# Patient Record
Sex: FEMALE | Race: WHITE
[De-identification: names, ages, dates, MRNs, and addresses within clinical notes are randomized per-mention and may not be internally consistent; named-entity substitution may affect disease eponyms.]

---
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
## 2022-02-04 ENCOUNTER — HOSPITAL ENCOUNTER (EMERGENCY)
Dept: HOSPITAL 63 - ER | Age: 22
Discharge: HOME | End: 2022-02-04
Payer: SELF-PAY

## 2022-02-04 VITALS — HEIGHT: 62 IN | BODY MASS INDEX: 36.88 KG/M2 | WEIGHT: 200.4 LBS

## 2022-02-04 VITALS — SYSTOLIC BLOOD PRESSURE: 137 MMHG | DIASTOLIC BLOOD PRESSURE: 89 MMHG

## 2022-02-04 DIAGNOSIS — R51.9: ICD-10-CM
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
## 2022-02-04 DIAGNOSIS — S33.6XXA: Primary | ICD-10-CM
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
## 2022-02-04 DIAGNOSIS — F17.210: ICD-10-CM
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
## 2022-02-04 PROCEDURE — 99284 EMERGENCY DEPT VISIT MOD MDM: CPT

## 2022-02-04 PROCEDURE — 72100 X-RAY EXAM L-S SPINE 2/3 VWS: CPT

## 2022-02-04 NOTE — RAD
Lumbar spine 3 views.



HISTORY: Pain left lower lumbar



3 views were taken of the lumbar spine. Bowel pattern of the abdomen is unremarkable. Lumbar spine is
 in normal alignment. A fracture is not identified. There is transitional anatomy at the thoracolumba
r junction and lumbosacral junction.



IMPRESSION:

1. No fracture or acute osseous abnormality in the lumbar spine.



Electronically signed by: Nabil Cherry MD (2/4/2022 9:03 AM) [id number]

## 2022-02-04 NOTE — PHYS DOC
Past History


Past Medical History:  No Pertinent History


Past Surgical History:  Tonsillectomy


Smoking:  Cigarettes


Alcohol Use:  Occasionally


Drug Use:  Marijuana





General Adult


EDM:


Chief Complaint:  MECHANICAL FALL





HPI:


HPI:


21-year-old female presents after an accident at work.  The patient was walking 

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 

christina full of materials.  The patient was also carrying a box.  She got her 

right foot twisted and the christina and fell to the ground.  She knows that she hit

the back of her head on the ground.  She was not knocked unconscious.  She was 

stunned for 10 to 15 seconds.  She did not have any vomiting.  She denies any s

ignificant neurological symptoms.  Her primary concern is low back pain.  She is

not sure if she hit her back or just twisted it as she fell but she has pain in 

the low back worse on the left.  No history of back problems or surgeries.  No 

loss of bowel or bladder.  She has no other complaints at this time.





Review of Systems:


Review of Systems:


Constitutional:  Denies fever or chills 


Eyes:  Denies change in visual acuity 


HENT:  Denies nasal congestion or sore throat 


Respiratory:  Denies cough or shortness of breath 


Cardiovascular:  Denies chest pain or edema 


GI:  Denies abdominal pain, nausea, vomiting, bloody stools or diarrhea 


: Denies dysuria 


Musculoskeletal: Low back pain


Integument:  Denies rash 


Neurologic:  Mild headache. Denies focal weakness or sensory changes 


Endocrine:  Denies polyuria or polydipsia 


Lymphatic:  Denies swollen glands 


Psychiatric:  Denies depression or anxiety





Allergies:


Allergies:





Allergies








Coded Allergies Type Severity Reaction Last Updated Verified


 


  No Known Drug Allergies    1/30/19 No











Physical Exam:


PE:





Constitutional: Well developed, well nourished, obese, no acute distress, non-

toxic appearance. []


HENT: Normocephalic, atraumatic, bilateral external ears normal, oropharynx 

moist, no oral exudates, nose normal. []


Eyes: PERRLA, EOMI, conjunctiva normal, no discharge. [] 


Neck: Normal range of motion, no tenderness, supple, no stridor. [] 


Cardiovascular: Heart rate regular rhythm, no murmur []


Lungs & Thorax:  Bilateral breath sounds clear to auscultation []


Abdomen: Bowel sounds normal, soft, no tenderness, no masses, no pulsatile 

masses. [] 


Skin: Warm, dry, no erythema, no rash. [] 


Back: Tenderness over the sacrum worse at the left sacroiliac joint.  [] 


Extremities: No tenderness, no cyanosis, no clubbing, ROM intact, no edema. [] 


Neurologic: Alert and oriented X 3, normal motor function, normal sensory 

function, no focal deficits noted. []


Psychologic: Affect normal, judgement normal, mood normal. []





Current Patient Data:


Vital Signs:





                                   Vital Signs








  Date Time  Temp Pulse Resp B/P (MAP) Pulse Ox O2 Delivery O2 Flow Rate FiO2


 


2/4/22 08:26 97.9 109 16 137/89 (105) 99 Room Air  











EKG:


EKG:


[]





Radiology/Procedures:


Radiology/Procedures:


[]


Impressions:


Lumbar spine 3 views.





HISTORY: Pain left lower lumbar





3 views were taken of the lumbar spine. Bowel pattern of the abdomen is 

unremarkable. Lumbar spine is in normal alignment. A fracture is not identified.

 There is transitional anatomy at the thoracolumbar junction and lumbosacral 

junction.





IMPRESSION:


1. No fracture or acute osseous abnormality in the lumbar spine.





Electronically signed by: Nabil Amaya MD (2/4/2022 9:03 AM) [id number]














DICTATED AND SIGNED BY:     NABIL AMAYA MD


DATE:     02/04/22 0902





CC: TORI ZIMMERMAN DO; DEANGELO BOB ~MTH0 0





Heart Score:


C/O Chest Pain:  N/A


Risk Factors:


Risk Factors:  DM, Current or recent (<one month) smoker, HTN, HLP, family 

history of CAD, obesity.


Risk Scores:


Score 0 - 3:  2.5% MACE over next 6 weeks - Discharge Home


Score 4 - 6:  20.3% MACE over next 6 weeks - Admit for Clinical Observation


Score 7 - 10:  72.7% MACE over next 6 weeks - Early Invasive Strategies





Course & Med Decision Making:


Course & Med Decision Making


Pertinent Labs and Imaging studies reviewed. (See chart for details)


The patient's x-ray is negative for acute findings.  Based on my exam, I believe

 the patient has acute strain of the sacroiliac joint worse on the left.  We 

will treat her with prednisone and Flexeril.  We will give her the first dose 

emergency room.  She is stable for discharge at this time.


[]





Dragon Disclaimer:


Dragon Disclaimer:


This electronic medical record was generated, in whole or in part, using a voice

 recognition dictation system.





Departure


Departure:


Impression:  


   Primary Impression:  


   Sacroiliac (ligament) sprain


   Qualified Codes:  S33.6XXA - Sprain of sacroiliac joint, initial encounter


Disposition:  01 HOME / SELF CARE / HOMELESS


Condition:  STABLE


Referrals:  


DEANGELO BOB (PCP)


Patient Instructions:  Sacroiliac Joint Dysfunction


Scripts


Prednisone (PREDNISONE) 10 Mg Tablet


50 MG PO DAILY for back strain for 3 Days, #15 TAB


   Prov: TORI ZIMMERMAN DO         2/4/22 


Cyclobenzaprine Hcl (CYCLOBENZAPRINE HCL) 10 Mg Tablet


1 TAB PO TID PRN for MUSCLE SPASMS, #30 TAB


   Prov: TORI ZIMMERMAN DO         2/4/22











TORI ZIMMERMAN DO                  Feb 4, 2022 08:44

## 2022-03-01 ENCOUNTER — HOSPITAL ENCOUNTER (EMERGENCY)
Dept: HOSPITAL 63 - ER | Age: 22
Discharge: HOME | End: 2022-03-01
Payer: SELF-PAY

## 2022-03-01 VITALS — DIASTOLIC BLOOD PRESSURE: 67 MMHG | SYSTOLIC BLOOD PRESSURE: 113 MMHG

## 2022-03-01 VITALS — WEIGHT: 200.4 LBS | BODY MASS INDEX: 30.37 KG/M2 | HEIGHT: 68 IN

## 2022-03-01 DIAGNOSIS — L02.31: Primary | ICD-10-CM

## 2022-03-01 DIAGNOSIS — F17.210: ICD-10-CM

## 2022-03-01 PROCEDURE — 10060 I&D ABSCESS SIMPLE/SINGLE: CPT

## 2022-03-01 PROCEDURE — 99283 EMERGENCY DEPT VISIT LOW MDM: CPT

## 2022-03-01 NOTE — PHYS DOC
Past History


Past Medical History:  No Pertinent History


Past Surgical History:  Tonsillectomy


Smoking:  Cigarettes


Alcohol Use:  None


Drug Use:  Marijuana





General Adult


EDM:


Chief Complaint:  ABSCESS





HPI:


HPI:


Patient is a 22-year-old female who presents to the emergency department with a 

abscess to her left buttock that started 2 days ago.  Patient denies any rectal 

pain, nausea, vomiting, fevers.  She reports that she is has a history of 

abscesses in this area and was seen in this emergency department and had it 

drained.





Review of Systems:


Review of Systems:


Constitutional: See HPI


GI: HPI


Musculoskeletal: See HPI


Integument: See HPI





Current Medications:


Current Meds:





Current Medications








 Medications


  (Trade)  Dose


 Ordered  Sig/Merrick  Start Time


 Stop Time Status Last Admin


Dose Admin


 


 Lidocaine HCl  20 ml  1X  ONCE  3/1/22 17:45


 3/1/22 17:46 DC  





 


 Trimethoprim/


 Sulfamethoxazole


  (Bactrim Ds)  1 tab  1X  ONCE  3/1/22 17:45


 3/1/22 17:46 DC  














Allergies:


Allergies:





Allergies








Coded Allergies Type Severity Reaction Last Updated Verified


 


  No Known Drug Allergies    1/30/19 No











Physical Exam:


PE:





Constitutional: Well developed, well nourished, no acute distress, non-toxic 

appearance. []


HENT: Normocephalic, atraumatic, bilateral external ears normal, oropharynx 

moist, no oral exudates, nose normal. []


Eyes: PERRL, EOMI, conjunctiva normal, no discharge. [] 


Neck: Normal range of motion, no stridor


Cardiovascular: Normal peripheral perfusion


Lungs & Thorax: Normal work of breathing, no tachypnea


Abdomen: Soft and flat


Skin: Warm, dry, 5 cm area of redness to left inner buttock with an area of 

swelling consistent with an abscess measuring 3 cm, no active drainage, small 

area of fluctuance noted


Back: Normal range of motion


Extremities: No tenderness, no cyanosis, no clubbing, ROM intact, no edema. [] 


Neurologic: Alert and oriented X 3, normal motor function, normal sensory 

function, no focal deficits noted. []


Psychologic: Affect normal, judgement normal, mood normal. []





EKG:


EKG:


[]





Radiology/Procedures:


Radiology/Procedures:


[]





Heart Score:


C/O Chest Pain:  N/A


Risk Factors:


Risk Factors:  DM, Current or recent (<one month) smoker, HTN, HLP, family 

history of CAD, obesity.


Risk Scores:


Score 0 - 3:  2.5% MACE over next 6 weeks - Discharge Home


Score 4 - 6:  20.3% MACE over next 6 weeks - Admit for Clinical Observation


Score 7 - 10:  72.7% MACE over next 6 weeks - Early Invasive Strategies





Course & Med Decision Making:


Course & Med Decision Making


Pertinent Labs and Imaging studies reviewed. (See chart for details)





[] Patient presents to the emergency department for an abscess to her left inner

 buttock that started 2 days ago.  Patient reports that her tetanus is 

up-to-date.  Incision and drainage performed.  Patient tolerated procedure.  

Dressing placed.  Patient discharged home with antibiotic.  Patient advised to 

apply warm compresses and take Tylenol and ibuprofen for her pain.  I discussed 

with patient all findings and diagnostic testing as well as the need to follow-

up with PCP for further evaluation and treatment or return to the ER if any new 

or worsening symptoms.  Strict return precautions were also discussed at length.

  Patient voiced understanding and agreement with the plan.  Patient is 

hemodynamically stable at the time of disposition.





Dragon Disclaimer:


Dragon Disclaimer:


This electronic medical record was generated, in whole or in part, using a voice

 recognition dictation system.


Incision and Drainage


Indication: [Abscess to left buttock





Procedure: The patient was positioned appropriately and the skin over the 

incision site was Betadine. Local anesthesia was 1% lidocaine.  An incision was 

then made over the apex of the abscess on the left inner buttock and very small 

amount of serosanguineous material was expressed.  The patients tetanus status 

is up-to-date


The patient tolerated the procedure 





Complications: None





Departure


Departure:


Impression:  


   Primary Impression:  


   Abscess


Disposition:  01 HOME / SELF CARE / HOMELESS


Condition:  GOOD


Referrals:  


DEANGELO BOB (PCP)


Patient Instructions:  Abscess, Abscess, Care After





Additional Instructions:  


You were seen in the emergency department today for an abscess to your buttock. 

 This was numbed and drained in the emergency department.  Please continue to 

apply warm compresses to the area to help with the drainage.  He can take 

Tylenol and ibuprofen for pain at home.  You are being discharged home with an 

antibiotic.  Please start and finish this completely.  Take Tylenol and 

ibuprofen for any pain at home.  Follow-up with your primary care provider 

tomorrow regarding your ER visit.  Return to the emergency department if you 

develop worsening of your abscess, worsening of the redness, high fevers 

refractory to treatment, intractable nausea or vomiting, rectal pain, weakness.


Scripts


Sulfamethoxazole/Trimethoprim (BACTRIM DS TABLET) 1 Each Tablet


1 TAB PO BID for abscess for 7 Days, #14 TAB 0 Refills


   Prov: NANCY MCCORD         3/1/22











NANCY MCCORD           Mar 1, 2022 17:58

## 2022-04-01 ENCOUNTER — HOSPITAL ENCOUNTER (EMERGENCY)
Dept: HOSPITAL 63 - ER | Age: 22
Discharge: HOME | End: 2022-04-01
Payer: SELF-PAY

## 2022-04-01 VITALS — WEIGHT: 169.09 LBS | BODY MASS INDEX: 29.96 KG/M2 | HEIGHT: 63 IN

## 2022-04-01 VITALS — SYSTOLIC BLOOD PRESSURE: 122 MMHG | DIASTOLIC BLOOD PRESSURE: 89 MMHG

## 2022-04-01 DIAGNOSIS — R51.9: ICD-10-CM

## 2022-04-01 DIAGNOSIS — R11.10: Primary | ICD-10-CM

## 2022-04-01 DIAGNOSIS — F17.210: ICD-10-CM

## 2022-04-01 LAB
ALBUMIN SERPL-MCNC: 4.7 G/DL (ref 3.4–5)
ALBUMIN/GLOB SERPL: 1.1 {RATIO} (ref 1–1.7)
ALP SERPL-CCNC: 63 U/L (ref 46–116)
ALT SERPL-CCNC: 25 U/L (ref 14–59)
AMPHETAMINE/METHAMPHETAMINE: (no result)
ANION GAP SERPL CALC-SCNC: 11 MMOL/L (ref 6–14)
APTT PPP: YELLOW S
AST SERPL-CCNC: 19 U/L (ref 15–37)
BACTERIA #/AREA URNS HPF: 0 /HPF
BARBITURATES UR-MCNC: (no result) UG/ML
BASOPHILS # BLD AUTO: 0.1 X10^3/UL (ref 0–0.2)
BASOPHILS NFR BLD: 1 % (ref 0–3)
BENZODIAZ UR-MCNC: (no result) UG/L
BILIRUB SERPL-MCNC: 0.5 MG/DL (ref 0.2–1)
BUN/CREAT SERPL: 13 (ref 6–20)
CA-I SERPL ISE-MCNC: 8 MG/DL (ref 7–20)
CALCIUM SERPL-MCNC: 9.7 MG/DL (ref 8.5–10.1)
CANNABINOIDS UR-MCNC: (no result) UG/L
CHLORIDE SERPL-SCNC: 102 MMOL/L (ref 98–107)
CO2 SERPL-SCNC: 24 MMOL/L (ref 21–32)
COCAINE UR-MCNC: (no result) NG/ML
CREAT SERPL-MCNC: 0.6 MG/DL (ref 0.6–1)
EOSINOPHIL NFR BLD: 0 % (ref 0–3)
EOSINOPHIL NFR BLD: 0 X10^3/UL (ref 0–0.7)
ERYTHROCYTE [DISTWIDTH] IN BLOOD BY AUTOMATED COUNT: 13.8 % (ref 11.5–14.5)
FIBRINOGEN PPP-MCNC: CLEAR MG/DL
GFR SERPLBLD BASED ON 1.73 SQ M-ARVRAT: 126.2 ML/MIN
GLOBULIN SER-MCNC: 4.1 G/DL (ref 2.2–3.8)
GLUCOSE SERPL-MCNC: 110 MG/DL (ref 70–99)
GLUCOSE UR STRIP-MCNC: (no result) MG/DL
HCT VFR BLD CALC: 49.9 % (ref 36–47)
HGB BLD-MCNC: 16.6 G/DL (ref 12–15.5)
LIPASE: 129 U/L (ref 73–393)
LYMPHOCYTES # BLD: 1.2 X10^3/UL (ref 1–4.8)
LYMPHOCYTES NFR BLD AUTO: 15 % (ref 24–48)
MCH RBC QN AUTO: 31 PG (ref 25–35)
MCHC RBC AUTO-ENTMCNC: 33 G/DL (ref 31–37)
MCV RBC AUTO: 94 FL (ref 79–100)
METHADONE SERPL-MCNC: (no result) NG/ML
MONO #: 0.3 X10^3/UL (ref 0–1.1)
MONOCYTES NFR BLD: 4 % (ref 0–9)
NEUT #: 6.7 X10^3UL (ref 1.8–7.7)
NEUTROPHILS NFR BLD AUTO: 80 % (ref 31–73)
NITRITE UR QL STRIP: (no result)
OPIATES UR-MCNC: (no result) NG/ML
PCP SERPL-MCNC: (no result) MG/DL
PLATELET # BLD AUTO: 270 X10^3/UL (ref 140–400)
POTASSIUM SERPL-SCNC: 3.9 MMOL/L (ref 3.5–5.1)
PROT SERPL-MCNC: 8.8 G/DL (ref 6.4–8.2)
RBC # BLD AUTO: 5.33 X10^6/UL (ref 3.5–5.4)
RBC #/AREA URNS HPF: 0 /HPF (ref 0–2)
SODIUM SERPL-SCNC: 137 MMOL/L (ref 136–145)
SP GR UR STRIP: 1.02
SQUAMOUS #/AREA URNS LPF: (no result) /LPF
UROBILINOGEN UR-MCNC: 0.2 MG/DL
WBC # BLD AUTO: 8.3 X10^3/UL (ref 4–11)
WBC #/AREA URNS HPF: 0 /HPF (ref 0–4)

## 2022-04-01 PROCEDURE — 96361 HYDRATE IV INFUSION ADD-ON: CPT

## 2022-04-01 PROCEDURE — 80053 COMPREHEN METABOLIC PANEL: CPT

## 2022-04-01 PROCEDURE — 85025 COMPLETE CBC W/AUTO DIFF WBC: CPT

## 2022-04-01 PROCEDURE — 96374 THER/PROPH/DIAG INJ IV PUSH: CPT

## 2022-04-01 PROCEDURE — 99284 EMERGENCY DEPT VISIT MOD MDM: CPT

## 2022-04-01 PROCEDURE — 83690 ASSAY OF LIPASE: CPT

## 2022-04-01 PROCEDURE — 81025 URINE PREGNANCY TEST: CPT

## 2022-04-01 PROCEDURE — 70450 CT HEAD/BRAIN W/O DYE: CPT

## 2022-04-01 PROCEDURE — 36415 COLL VENOUS BLD VENIPUNCTURE: CPT

## 2022-04-01 PROCEDURE — 80307 DRUG TEST PRSMV CHEM ANLYZR: CPT

## 2022-04-01 PROCEDURE — 81001 URINALYSIS AUTO W/SCOPE: CPT

## 2022-04-01 NOTE — PHYS DOC
Past History


Past Medical History:  No Pertinent History


Past Surgical History:  Tonsillectomy


Smoking:  Cigarettes


Alcohol Use:  Occasionally


Drug Use:  Marijuana





General Adult


EDM:


Chief Complaint:  NAUSEA/VOMITING/DIARRHEA





HPI:


HPI:





Patient is a 22-year-old female coming in for 1 hour of emesis, patient states s

he has had nonbloody nonbilious emesis 5-6 times.  Patient states she has not 

eaten anything today, denies any recent travel, raw or undercooked foods, or 

sick contacts.  Patient also states she has had a headache since last night.  

Patient says she does not normally get migraines or headaches.  Patient states 

she has had similar episode after taking "bad acid".  Patient states she does 

smoke marijuana daily but has not had any today.  Denies any vision changes, 

sore throat, chest pain or cough, fevers, urinary changes, or diarrhea





Review of Systems:


Review of Systems:


All other systems within normal limits except for as noted in the HPI





Allergies:


Allergies:





Allergies








Coded Allergies Type Severity Reaction Last Updated Verified


 


  No Known Drug Allergies    22 No











Physical Exam:


PE:


Constitutional: Well developed, well nourished, no acute distress, non-toxic 

appearance. []


HENT: Normocephalic, atraumatic, bilateral external ears normal,  nose normal. 

[]


Eyes: PERRLA, conjunctiva normal, no discharge. [] 


Neck: No rigidity, supple, no stridor. [] 


Cardiovascular: Regular rate and rhythm, brisk cap refill []


Lungs & Thorax: Non labored symmetric respirations, no tachypnea or respiratory 

distress []


Abdomen: Soft, nondistended, periumbilical tenderness palpation without guarding

 or rebound, no McBurney's point tenderness, negative Musa's


Skin: Warm, dry, no erythema, no rash. [] 


Back: Unremarkable


Extremities: No deformities, range of motion grossly intact, no lower extremity 

edema [] 


Neurologic: Alert and oriented X 3, no focal deficits noted. []


Psychologic: Affect normal, judgement normal, mood normal. []





EKG:


EKG:


[]





Radiology/Procedures:


Radiology/Procedures:


[][facility] [address], [place] [address] [phone]


                                        


                                 IMAGING REPORT





                                     Signed





PATIENT: LAVINIA REGALADO  ACCOUNT: [id number]     MRN#: [id number]


: 2000           LOCATION: ER              AGE: 22


SEX: F                    EXAM DT: 22         ACCESSION#: [id number]


STATUS: REG ER            ORD. PHYSICIAN: ALEX AGUILA MD


REASON: headache and vomiting


PROCEDURE: CT HEAD WO CONTRAST





EXAMINATION: CT head without IV contrast





INDICATION:22 years, Female, headache and vomiting.





COMPARISON: None





TECHNIQUE: Spiral acquisition of contiguous images from the skull base to the 

vertex were obtained. Sagittal and coronal 2D reformatted series were provided 

by the technologist. Soft tissue and bone window algorithms were reviewed. 





Exposure: One or more of the following individualized dose reduction techniques 

were utilized for this examination:  


1. Automated exposure control  


2. Adjustment of the mA and/or kV according to patient size  


3. Use of iterative reconstruction technique.





FINDINGS:


Neither mass, midline shift, intracranial hemorrhage, acute/subacute ischemic 

changes, nor extraaxial fluid collections are seen. 


The brain parenchyma is normal in appearance.


The ventricles are normal in size.


The paranasal sinuses, mastoid air cells, and middle ears are clear.


The orbital contents appear within normal limits. Review of bone windows reveals

 no suspicious osseous abnormalities. Soft tissues are grossly unremarkable.





IMPRESSION:


No evidence of acute intracranial abnormality.





Electronically signed by: Nilda Condon DO (2022 12:33 PM) [id number]














DICTATED AND SIGNED BY:     NILDA CONDON DO


DATE:     22 1231





CC: ALEX AGUILA MD; DEANGELO BOB ~





Heart Score:


C/O Chest Pain:  No


Risk Factors:


Risk Factors:  DM, Current or recent (<one month) smoker, HTN, HLP, family 

history of CAD, obesity.


Risk Scores:


Score 0 - 3:  2.5% MACE over next 6 weeks - Discharge Home


Score 4 - 6:  20.3% MACE over next 6 weeks - Admit for Clinical Observation


Score 7 - 10:  72.7% MACE over next 6 weeks - Early Invasive Strategies





Course & Med Decision Making:


Course & Med Decision Making


Pertinent Labs and Imaging studies reviewed. (See chart for details)





[]





Dragon Disclaimer:


Dragon Disclaimer:


This electronic medical record was generated, in whole or in part, using a voice

 recognition dictation system.





Departure


Departure:


Impression:  


   Primary Impression:  


   Vomiting


Disposition:  01 HOME / SELF CARE / HOMELESS


Condition:  STABLE


Referrals:  


DEANGELO BOB (PCP)


Patient Instructions:  Nausea and Vomiting


Scripts


Ondansetron (ONDANSETRON ODT) 4 Mg Tab.rapdis


1 TAB PO PRN Q6-8HRS PRN for NAUSEA, #10 TAB


   Prov: ALEX AGUILA MD         22











ALEX AGUILA MD             2022 11:47

## 2022-04-01 NOTE — RAD
EXAMINATION: CT head without IV contrast



INDICATION:22 years, Female, headache and vomiting.



COMPARISON: None



TECHNIQUE: Spiral acquisition of contiguous images from the skull base to the vertex were obtained. S
agittal and coronal 2D reformatted series were provided by the technologist. Soft tissue and bone win
donavan algorithms were reviewed. 



Exposure: One or more of the following individualized dose reduction techniques were utilized for thi
s examination:  

1. Automated exposure control  

2. Adjustment of the mA and/or kV according to patient size  

3. Use of iterative reconstruction technique.



FINDINGS:

Neither mass, midline shift, intracranial hemorrhage, acute/subacute ischemic changes, nor extraaxial
 fluid collections are seen. 

The brain parenchyma is normal in appearance.

The ventricles are normal in size.

The paranasal sinuses, mastoid air cells, and middle ears are clear.

The orbital contents appear within normal limits. Review of bone windows reveals no suspicious osseou
s abnormalities. Soft tissues are grossly unremarkable.



IMPRESSION:

No evidence of acute intracranial abnormality.



Electronically signed by: Josué Condon DO (4/1/2022 12:33 PM) [id number]